# Patient Record
Sex: FEMALE | Race: WHITE
[De-identification: names, ages, dates, MRNs, and addresses within clinical notes are randomized per-mention and may not be internally consistent; named-entity substitution may affect disease eponyms.]

---

## 2018-03-20 ENCOUNTER — HOSPITAL ENCOUNTER (INPATIENT)
Dept: HOSPITAL 47 - 4FBP | Age: 33
LOS: 2 days | Discharge: HOME | End: 2018-03-22
Payer: COMMERCIAL

## 2018-03-20 VITALS — BODY MASS INDEX: 29.2 KG/M2

## 2018-03-20 DIAGNOSIS — L29.9: ICD-10-CM

## 2018-03-20 DIAGNOSIS — Z87.891: ICD-10-CM

## 2018-03-20 DIAGNOSIS — Q51.2: ICD-10-CM

## 2018-03-20 DIAGNOSIS — Z3A.39: ICD-10-CM

## 2018-03-20 DIAGNOSIS — O34.03: Primary | ICD-10-CM

## 2018-03-20 LAB
BASOPHILS # BLD AUTO: 0 K/UL (ref 0–0.2)
BASOPHILS NFR BLD AUTO: 0 %
EOSINOPHIL # BLD AUTO: 0.1 K/UL (ref 0–0.7)
EOSINOPHIL NFR BLD AUTO: 1 %
ERYTHROCYTE [DISTWIDTH] IN BLOOD BY AUTOMATED COUNT: 4.35 M/UL (ref 3.8–5.4)
ERYTHROCYTE [DISTWIDTH] IN BLOOD: 13.5 % (ref 11.5–15.5)
HCT VFR BLD AUTO: 39 % (ref 34–46)
HGB BLD-MCNC: 12.9 GM/DL (ref 11.4–16)
LYMPHOCYTES # SPEC AUTO: 1.7 K/UL (ref 1–4.8)
LYMPHOCYTES NFR SPEC AUTO: 15 %
MCH RBC QN AUTO: 29.7 PG (ref 25–35)
MCHC RBC AUTO-ENTMCNC: 33.1 G/DL (ref 31–37)
MCV RBC AUTO: 89.8 FL (ref 80–100)
MONOCYTES # BLD AUTO: 0.5 K/UL (ref 0–1)
MONOCYTES NFR BLD AUTO: 5 %
NEUTROPHILS # BLD AUTO: 8.7 K/UL (ref 1.3–7.7)
NEUTROPHILS NFR BLD AUTO: 77 %
PLATELET # BLD AUTO: 192 K/UL (ref 150–450)
WBC # BLD AUTO: 11.2 K/UL (ref 3.8–10.6)

## 2018-03-20 PROCEDURE — 88307 TISSUE EXAM BY PATHOLOGIST: CPT

## 2018-03-20 PROCEDURE — 86850 RBC ANTIBODY SCREEN: CPT

## 2018-03-20 PROCEDURE — 86900 BLOOD TYPING SEROLOGIC ABO: CPT

## 2018-03-20 PROCEDURE — 85025 COMPLETE CBC W/AUTO DIFF WBC: CPT

## 2018-03-20 PROCEDURE — 86901 BLOOD TYPING SEROLOGIC RH(D): CPT

## 2018-03-20 RX ADMIN — POTASSIUM CHLORIDE SCH MLS/HR: 14.9 INJECTION, SOLUTION INTRAVENOUS at 11:28

## 2018-03-20 RX ADMIN — POTASSIUM CHLORIDE SCH: 14.9 INJECTION, SOLUTION INTRAVENOUS at 15:55

## 2018-03-20 RX ADMIN — DOCUSATE SODIUM AND SENNOSIDES SCH EACH: 50; 8.6 TABLET ORAL at 21:48

## 2018-03-20 RX ADMIN — POTASSIUM CHLORIDE SCH MLS/HR: 14.9 INJECTION, SOLUTION INTRAVENOUS at 10:53

## 2018-03-20 RX ADMIN — KETOROLAC TROMETHAMINE PRN MG: 30 INJECTION, SOLUTION INTRAMUSCULAR at 21:48

## 2018-03-20 NOTE — P.HPOB
History of Present Illness


H&P Date: 18


Chief Complaint: IUP @ 39 weeks, h/o uterine septum 





31yo  that presents for primary LTCS, secondary to uterine septum repair.  


Prenatal care is uneventful 





prenatal blood work shows a blood type of O+, rubella immune, hepatitis B 

surface antigen negative, GBS negative, HIV negative, RPR negative





Review of Systems


Constitutional: Denies chills, Denies fatigue, Denies fever


Respiratory: Denies cough, Denies dyspnea


Gastrointestinal: Denies constipation, Denies diarrhea


Genitourinary: Reports pregnant





Past Medical History


Past Medical History: No Reported History


History of Any Multi-Drug Resistant Organisms: None Reported


Past Surgical History: Orthopedic Surgery


Additional Past Surgical History / Comment(s): RT KNEE SCOPE.  COLONOSCOPY.  

EGD.  UTERUS SX


Past Anesthesia/Blood Transfusion Reactions: No Reported Reaction


Past Psychological History: No Psychological Hx Reported


Smoking Status: Former smoker


Past Alcohol Use History: None Reported


Additional Past Alcohol Use History / Comment(s): QUIT SMOKING 2017


Past Drug Use History: None Reported





- Past Family History


  ** Mother


Family Medical History: No Reported History





Medications and Allergies


 Home Medications











 Medication  Instructions  Recorded  Confirmed  Type


 


Prenatal Vit No.124/Iron/Folic 1 each PO DAILY 09/21/15 03/20/18 History





[Prenatal Vitamin Tablet]    











 Allergies











Allergy/AdvReac Type Severity Reaction Status Date / Time


 


No Known Allergies Allergy   Verified 18 10:44














Exam


Osteopathic Statement: *.  No significant issues noted on an osteopathic 

structural exam other than those noted in the History and Physical/Consult.





- Vital Signs


Vital signs: 


 Vital Signs











  Temp Pulse Resp BP Pulse Ox


 


 18 10:46  97.5 F L  75  16  133/68  99








 Intake and Output











 18





 22:59 06:59 14:59


 


Other:   


 


  Weight   87.09 kg














- OBG Physical Exam


Abdomen: 





gravid


Uterus: enlarged





Results


Result Diagrams: 


 18 10:45





 Abnormal Lab Results - Last 24 Hours (Table)











  18 Range/Units





  10:45 


 


WBC  11.2 H  (3.8-10.6)  k/uL


 


Neutrophils #  8.7 H  (1.3-7.7)  k/uL














Assessment and Plan


(1) Term pregnancy


Current Visit: Yes   Status: Acute   Code(s): Z34.80 - ENCOUNTER FOR SUPRVSN OF 

NORMAL PREGNANCY, UNSP TRIMESTER   SNOMED Code(s): 06475771


   





(2) Partial septate uterus


Current Visit: Yes   Status: Acute   Code(s): Q51.2 - OTHER DOUBLING OF UTERUS 

  SNOMED Code(s): 35998228


   


Plan: 





LTCS secondary to prior uterine septum removal

## 2018-03-20 NOTE — P.OP
Date of Procedure: 03/20/18


Preoperative Diagnosis: 


IUP @ 39 weeks, h/o uterine septum repair


Postoperative Diagnosis: 


same





Procedure(s) Performed: 


primary LTCS


Anesthesia: spinal


Surgeon: Maryam Sotelo


Assistant #1: Panchito Hernandez


Estimated Blood Loss (ml): 500


IV fluids (ml): 1,200


Urine output (ml): 500


Pathology: other (placenta)


Condition: stable


Disposition: PACU


Indications for Procedure: 


h/o uterine septum repair


Operative Findings: 


Normal tubes uterus and ovaries are appreciated


Description of Procedure: 


The patient was prepped and draped in the usual fashion after spinal anesthesia 

was administered by the anesthesia department.  A Pfannenstiel incision was 

made and extended of the abdominal cavity without difficulty.  The bladder 

peritoneum was elevated and incised and reflected distally.  A 2 cm incision 

was made in the transverse plane of the lower uterine segment to enter the 

uterus at which time clear fluid was noted.  The incision was extended in both 

directions using the bandage scissors.  The fetal head was encountered within 

the field and delivered up and through the incision where the nose and mouth 

were thoroughly suctioned.  Remainder of the infant was delivered onto the 

surgical field where the cord was doubly clamped, cut, and the infant was 

passed for resuscitative measures with weight and Apgars as noted above.  A 

segment of cord was then doubly clamped, cut, and set aside should cord gases 

become necessary.  The placenta was delivered manually, intact, and was grossly 

normal with a grossly normal three-vessel cord.  The uterus was exteriorized 

and the interior cavity of the uterus swept of any remaining placental and 

membranous fragments with a laparotomy sponge.  The margins of the incision 

were grasped with allis clamps and the incision closed in 2 layers.  First 

layer was a running locking layer of 0 vicryl from margin to margin followed by 

a second layer of imbricating 0 vicryl from margin to margin.  Any small points 

of bleeding were then made hemostatic with the Bovie.  Once hemostasis was 

achieved, the posterior cul-de-sac was suctioned with a guard and the uterine 

and ovarian findings are as noted above.  The uterus was replaced within the 

abdominal cavity and the gutters swept of any remaining blood fluid or clot.  

The incision was again reexamined and hemostasis was noted to be excellent.  

Any small point of bleeding were made hemostatic with the Bovie.  Once 

hemostasis was achieved the parietal peritoneum was loosely reapproximated.  

The layer of muscles were examined and made hemostatic with the Bovie.  

Attention was then turned to the fascia which was closed with 2 running 

stitches of 0 Vicryl proceeding from the lateral margins to the midpoint.  The 

subcutaneous tissues were irrigated, made hemostatic with the Bovie.  The skin 

was reapproximated with 4-0 vicryl.  Estimated blood loss for the case was 

approximately 500 mL.  All sponge instrument and needle counts are correct.  

There were no complications.  The patient tolerated the procedure well and 

proceeded to the recovery room in stable condition.  Both mother and infant are 

resting comfortably in recovery.





infant girl delivered at 13:13, weight 7-13 apgars 9-9 at one and five mins 

respectively

## 2018-03-21 LAB
BASOPHILS # BLD AUTO: 0 K/UL (ref 0–0.2)
BASOPHILS NFR BLD AUTO: 0 %
EOSINOPHIL # BLD AUTO: 0.1 K/UL (ref 0–0.7)
EOSINOPHIL NFR BLD AUTO: 1 %
ERYTHROCYTE [DISTWIDTH] IN BLOOD BY AUTOMATED COUNT: 3.67 M/UL (ref 3.8–5.4)
ERYTHROCYTE [DISTWIDTH] IN BLOOD: 13.6 % (ref 11.5–15.5)
HCT VFR BLD AUTO: 33 % (ref 34–46)
HGB BLD-MCNC: 11.1 GM/DL (ref 11.4–16)
LYMPHOCYTES # SPEC AUTO: 1.6 K/UL (ref 1–4.8)
LYMPHOCYTES NFR SPEC AUTO: 14 %
MCH RBC QN AUTO: 30.3 PG (ref 25–35)
MCHC RBC AUTO-ENTMCNC: 33.7 G/DL (ref 31–37)
MCV RBC AUTO: 89.9 FL (ref 80–100)
MONOCYTES # BLD AUTO: 0.5 K/UL (ref 0–1)
MONOCYTES NFR BLD AUTO: 4 %
NEUTROPHILS # BLD AUTO: 9.4 K/UL (ref 1.3–7.7)
NEUTROPHILS NFR BLD AUTO: 80 %
PLATELET # BLD AUTO: 192 K/UL (ref 150–450)
WBC # BLD AUTO: 11.7 K/UL (ref 3.8–10.6)

## 2018-03-21 RX ADMIN — DOCUSATE SODIUM AND SENNOSIDES SCH: 50; 8.6 TABLET ORAL at 19:59

## 2018-03-21 RX ADMIN — IBUPROFEN PRN MG: 600 TABLET ORAL at 16:07

## 2018-03-21 RX ADMIN — DOCUSATE SODIUM AND SENNOSIDES SCH EACH: 50; 8.6 TABLET ORAL at 07:58

## 2018-03-21 RX ADMIN — KETOROLAC TROMETHAMINE PRN MG: 30 INJECTION, SOLUTION INTRAMUSCULAR at 03:56

## 2018-03-21 RX ADMIN — POTASSIUM CHLORIDE SCH: 14.9 INJECTION, SOLUTION INTRAVENOUS at 05:09

## 2018-03-21 RX ADMIN — PRENATAL W/O A VIT W/ FE FUMARATE-FA CAP 106.5-1 MG SCH EACH: 106.5 CAPSULE ORAL at 10:14

## 2018-03-21 RX ADMIN — ACETAMINOPHEN PRN MG: 325 TABLET, FILM COATED ORAL at 19:58

## 2018-03-21 RX ADMIN — IBUPROFEN PRN MG: 600 TABLET ORAL at 10:11

## 2018-03-21 RX ADMIN — POTASSIUM CHLORIDE SCH: 14.9 INJECTION, SOLUTION INTRAVENOUS at 02:41

## 2018-03-21 RX ADMIN — ACETAMINOPHEN PRN MG: 325 TABLET, FILM COATED ORAL at 23:43

## 2018-03-21 RX ADMIN — IBUPROFEN PRN MG: 600 TABLET ORAL at 22:50

## 2018-03-21 NOTE — P.PNOBGPC
Subjective





- Subjective


Principal diagnosis: Postop day 1, primary low transverse  section


Interval history: 





Doing well today, pain is well controlled.  No nausea or vomiting and 

tolerating a regular diet.  Positive ambulation, positive void


Patient reports: Reports appetite normal, Reports voiding normally, Reports 

pain well controlled, Reports ambulating normally


: doing well





Objective





- Vital Signs


Latest vital signs: 


 Vital Signs











  Temp Pulse Resp BP Pulse Ox


 


 18 08:00  97.3 F L  82  16  110/69 


 


 18 04:00  98.0 F  64  18  117/70  99


 


 18 02:00    16  


 


 18 00:00  98.2 F  60  18  129/73  97


 


 18 22:00    18  


 


 18 20:00  97.8 F  77  18  123/71  97


 


 18 18:00    20  


 


 18 15:55    16  


 


 18 15:42   63  16  115/74  98


 


 18 15:11  97.7 F  60  16  118/72  99


 


 18 14:42   75  16  118/56  98


 


 18 14:19    16   98


 


 18 14:12   79  16  113/55  98


 


 18 13:57   76  16  127/79  98


 


 18 13:42  96.8 F L  65  16  127/79  99


 


 18 10:46  97.5 F L  75  16  133/68  99








 Intake and Output











 18





 22:59 06:59 14:59


 


Intake Total  1300 100


 


Output Total 1450 500 800


 


Balance -1450 800 -700


 


Intake:   


 


  Intake, IV Titration  700 





  Amount   


 


    Lactated Ringers 1,000 ml  700 





    @ 125 mls/hr IV .Q8H [facility]   





    Rx#:[id number]   


 


  Oral  600 100


 


Output:   


 


  Urine 1450 500 800


 


    Uretheral (Lorenzo) 1300  


 


Other:   


 


  Voiding Method Indwelling Catheter  


 


  # Voids  1 1














- Exam


Abdomen: Present: normal appearance, soft


Incision: Present: normal, dry, intact


Uterus: Present: firm





- Labs


Labs: 


 Abnormal Lab Results - Last 24 Hours (Table)











  18 Range/Units





  10:45 


 


WBC  11.2 H  (3.8-10.6)  k/uL


 


Neutrophils #  8.7 H  (1.3-7.7)  k/uL














Assessment and Plan


(1) Term pregnancy


Current Visit: Yes   Status: Acute   Code(s): Z34.80 - ENCOUNTER FOR SUPRVSN OF 

NORMAL PREGNANCY, UNSP TRIMESTER   SNOMED Code(s): 15584233


   





(2) Partial septate uterus


Current Visit: Yes   Status: Acute   Code(s): Q51.2 - OTHER DOUBLING OF UTERUS 

  SNOMED Code(s): 19744786


   


Plan: 





Doing well postoperatively we will encourage routine postoperative care today.  

Increasing ambulation and advancing diet as tolerated

## 2018-03-21 NOTE — P.PN
Progress Note - Text


Progress Note Date: 18


Postoperative day 1 status post  section under spinal/epidural 

anesthesia and intrathecal/epidural Duramorph for postoperative analgesia.The  

patient is doing well, there is mild generalized skin itching.  There are no 

other anesthesia related complications.  Further management as per the patient 

primary team.

## 2018-03-22 VITALS
HEART RATE: 80 BPM | SYSTOLIC BLOOD PRESSURE: 121 MMHG | TEMPERATURE: 97.7 F | RESPIRATION RATE: 18 BRPM | DIASTOLIC BLOOD PRESSURE: 71 MMHG

## 2018-03-22 RX ADMIN — IBUPROFEN PRN MG: 600 TABLET ORAL at 07:52

## 2018-03-22 RX ADMIN — ACETAMINOPHEN PRN MG: 325 TABLET, FILM COATED ORAL at 04:10

## 2018-03-22 RX ADMIN — POTASSIUM CHLORIDE SCH: 14.9 INJECTION, SOLUTION INTRAVENOUS at 02:41

## 2018-03-22 RX ADMIN — DOCUSATE SODIUM AND SENNOSIDES SCH: 50; 8.6 TABLET ORAL at 10:20

## 2018-03-22 RX ADMIN — PRENATAL W/O A VIT W/ FE FUMARATE-FA CAP 106.5-1 MG SCH EACH: 106.5 CAPSULE ORAL at 07:55

## 2018-03-22 NOTE — P.DS
Providers


Date of admission: 


18 10:16





Expected date of discharge: 18


Attending physician: 


Maryam Sotelo





Primary care physician: 


Desirae Delong








- Discharge Diagnosis(es)


(1) Term pregnancy


Current Visit: Yes   Status: Acute   





(2) Partial septate uterus


Current Visit: Yes   Status: Acute   





(3) S/P  section


Current Visit: Yes   Status: Acute   


Hospital Course: 





This is a 32-year-old  4 para 0030 that presented to labor and delivery 

at 39-0/7 weeks for an estimated due date of 2018.  She was scheduled for 

a primary  section secondary to partial septate uterus that was 

repaired by reproductive endocrinology.  It was recommended that given this 

repair she will proceed with a primary low transverse  section.  

Patient was admitted to labor and delivery and  was performed without 

difficulty.  For further details on the  please see the operative 

report.  Patient delivered a viable female infant at 1313, weight of 7 lbs. 13 

oz., Apgars of 9 and 9 at one and 5 minutes respectively.  Patient's 

postoperative course has been uneventful.  She is breast-feeding, she is 

ambulating and voiding without difficulty.  She is tolerating a regular diet 

without nausea or vomiting and she states her pain is well-controlled.





Plan - Discharge Summary


Discharge Rx Participant: Yes


New Discharge Prescriptions: 


No Action


   Prenatal Vit No.124/Iron/Folic [Prenatal Vitamin Tablet] 1 each PO DAILY


Discharge Medication List





Prenatal Vit No.124/Iron/Folic [Prenatal Vitamin Tablet] 1 each PO DAILY 09/21/

15 [History]








Follow up Appointment(s)/Referral(s): 


Maryam Sotelo DO [Doctor of Osteopathic Medicine] - 2 Weeks


Patient Instructions/Handouts:   (DC)


Discharge Disposition: HOME SELF-CARE

## 2023-04-14 ENCOUNTER — HOSPITAL ENCOUNTER (OUTPATIENT)
Dept: HOSPITAL 47 - ORWHC2ENDO | Age: 38
End: 2023-04-14
Attending: INTERNAL MEDICINE
Payer: COMMERCIAL

## 2023-04-14 VITALS — BODY MASS INDEX: 22.1 KG/M2

## 2023-04-14 VITALS — SYSTOLIC BLOOD PRESSURE: 119 MMHG | DIASTOLIC BLOOD PRESSURE: 78 MMHG | HEART RATE: 82 BPM

## 2023-04-14 VITALS — TEMPERATURE: 97.2 F | RESPIRATION RATE: 16 BRPM

## 2023-04-14 DIAGNOSIS — K58.9: ICD-10-CM

## 2023-04-14 DIAGNOSIS — F17.200: ICD-10-CM

## 2023-04-14 DIAGNOSIS — Z79.899: ICD-10-CM

## 2023-04-14 DIAGNOSIS — K29.50: Primary | ICD-10-CM

## 2023-04-14 PROCEDURE — 43239 EGD BIOPSY SINGLE/MULTIPLE: CPT

## 2023-04-14 PROCEDURE — 81025 URINE PREGNANCY TEST: CPT

## 2023-04-14 PROCEDURE — 88305 TISSUE EXAM BY PATHOLOGIST: CPT

## 2023-04-14 NOTE — P.PCN
Date of Procedure: 04/14/23


Procedure(s) Performed: 


BRIEF HISTORY: Patient is a 37-year-old, pleasant, white female scheduled for an

upper endoscopy as a part of evaluation of abdominal bloating, diarrhea of 

several months duration.  Recently was noted to have positive serology for 

celiac disease.. 





PROCEDURE PERFORMED: Esophagogastroduodenoscopy with biopsy.





PREOPERATIVE DIAGNOSIS: Abdominal bloating/diarrhea/positive serology for celiac

disease. 





IV sedation per anesthesia. 





PROCEDURE: After informed consent was obtained, the patient  was brought into 

the endoscopy unit. IV sedation was administered by Anesthesia under continuous 

monitoring. Initially the Olympus GIF-140 video endoscope was inserted into the 

mouth. Esophagus intubated without any difficulty. It was gradually advanced 

into the stomach and duodenum and carefully examined. The bulb and the second 

part of the duodenum appeared normal.  Multiple biopsies were done from the 

duodenum to evaluate for celiac disease.  The scope at this time was withdrawn 

to the stomach, adequately insufflated with air, and upon careful examination, 

mucosa of the antrum had mild gastritis and biopsies were done from this area.  

Mucosa of the, body, cardia and the fundus appeared normal. The scope was then 

withdrawn into the esophagus. The GE junction was located at 39 cm from the 

incisors. The esophagus appeared normal. There were no erosions or ulcerations 

seen and the patient tolerated the procedure well. 





IMPRESSION: 


1.  Normal-appearing duodenum with no evidence of duodenitis.  S/p post biopsies

to rule out celiac disease


2.  Mild antral gastritis.





RECOMMENDATIONS: The findings of this examination were discussed with the 

patient  as well as a family.  She was advised to follow with the biopsy 

results.  She'll be seen in office in 2-3 weeks.